# Patient Record
Sex: FEMALE | Race: WHITE | NOT HISPANIC OR LATINO | Employment: FULL TIME
[De-identification: names, ages, dates, MRNs, and addresses within clinical notes are randomized per-mention and may not be internally consistent; named-entity substitution may affect disease eponyms.]

---

## 2017-01-31 ENCOUNTER — HOSPITAL (OUTPATIENT)
Dept: OTHER | Age: 57
End: 2017-01-31

## 2017-11-01 ENCOUNTER — HOSPITAL (OUTPATIENT)
Dept: OTHER | Age: 57
End: 2017-11-01

## 2020-06-29 ENCOUNTER — HOSPITAL (OUTPATIENT)
Dept: OTHER | Age: 60
End: 2020-06-29
Attending: INTERNAL MEDICINE

## 2020-11-19 ENCOUNTER — HOSPITAL ENCOUNTER (OUTPATIENT)
Age: 60
End: 2020-11-19
Attending: PLASTIC SURGERY | Admitting: PLASTIC SURGERY

## 2020-12-04 RX ORDER — LANSOPRAZOLE 30 MG/1
30 CAPSULE, DELAYED RELEASE ORAL DAILY
COMMUNITY

## 2020-12-04 RX ORDER — ESTRADIOL 1 MG/1
1 TABLET ORAL DAILY
COMMUNITY

## 2020-12-04 RX ORDER — DESLORATADINE 5 MG/1
5 TABLET ORAL DAILY
COMMUNITY

## 2020-12-04 RX ORDER — FLUTICASONE PROPIONATE AND SALMETEROL 250; 50 UG/1; UG/1
1 POWDER RESPIRATORY (INHALATION)
COMMUNITY

## 2020-12-04 RX ORDER — LEVOTHYROXINE SODIUM 0.05 MG/1
50 TABLET ORAL DAILY
COMMUNITY

## 2020-12-04 RX ORDER — LISINOPRIL 20 MG/1
20 TABLET ORAL DAILY
COMMUNITY

## 2020-12-05 ENCOUNTER — LAB SERVICES (OUTPATIENT)
Dept: LAB | Age: 60
End: 2020-12-05

## 2020-12-05 DIAGNOSIS — Z01.812 PRE-PROCEDURAL LABORATORY EXAMINATION: ICD-10-CM

## 2020-12-05 DIAGNOSIS — Z01.812 PRE-PROCEDURE LAB EXAM: Primary | ICD-10-CM

## 2020-12-05 LAB
SARS-COV-2 RNA RESP QL NAA+PROBE: NOT DETECTED
SERVICE CMNT-IMP: NORMAL
SERVICE CMNT-IMP: NORMAL

## 2020-12-05 PROCEDURE — U0003 INFECTIOUS AGENT DETECTION BY NUCLEIC ACID (DNA OR RNA); SEVERE ACUTE RESPIRATORY SYNDROME CORONAVIRUS 2 (SARS-COV-2) (CORONAVIRUS DISEASE [COVID-19]), AMPLIFIED PROBE TECHNIQUE, MAKING USE OF HIGH THROUGHPUT TECHNOLOGIES AS DESCRIBED BY CMS-2020-01-R: HCPCS | Performed by: PLASTIC SURGERY

## 2020-12-07 ENCOUNTER — HOSPITAL ENCOUNTER (OUTPATIENT)
Age: 60
Discharge: HOME | End: 2020-12-07
Attending: PLASTIC SURGERY | Admitting: PLASTIC SURGERY

## 2020-12-07 VITALS
TEMPERATURE: 97.2 F | BODY MASS INDEX: 34.15 KG/M2 | WEIGHT: 200 LBS | DIASTOLIC BLOOD PRESSURE: 86 MMHG | RESPIRATION RATE: 15 BRPM | SYSTOLIC BLOOD PRESSURE: 129 MMHG | HEART RATE: 74 BPM | OXYGEN SATURATION: 98 % | HEIGHT: 64 IN

## 2020-12-07 PROCEDURE — 10002801 HB RX 250 W/O HCPCS: Performed by: PLASTIC SURGERY

## 2020-12-07 PROCEDURE — 13000115 HB ORTHO BASIC CASE EA ADD MINUTE: Performed by: PLASTIC SURGERY

## 2020-12-07 PROCEDURE — 10002803 HB RX 637: Performed by: PLASTIC SURGERY

## 2020-12-07 PROCEDURE — 13000114 HB ORTHO BASIC CASE S/U + 1ST 15 MIN: Performed by: PLASTIC SURGERY

## 2020-12-07 PROCEDURE — 13000001 HB PHASE II RECOVERY EA 30 MINUTES: Performed by: PLASTIC SURGERY

## 2020-12-07 PROCEDURE — 10006023 HB SUPPLY 272: Performed by: PLASTIC SURGERY

## 2020-12-07 PROCEDURE — 88342 IMHCHEM/IMCYTCHM 1ST ANTB: CPT | Performed by: PLASTIC SURGERY

## 2020-12-07 RX ORDER — BACITRACIN ZINC 500 [USP'U]/G
OINTMENT TOPICAL PRN
Status: DISCONTINUED | OUTPATIENT
Start: 2020-12-07 | End: 2020-12-07 | Stop reason: HOSPADM

## 2020-12-07 ASSESSMENT — PAIN SCALES - GENERAL
PAINLEVEL_OUTOF10: 0
PAINLEVEL_OUTOF10: 0

## 2020-12-16 LAB
ASR DISCLAIMER: NORMAL
CASE RPRT: NORMAL
CLINICAL INFO: NORMAL
PATH REPORT.FINAL DX SPEC: NORMAL
PATH REPORT.GROSS SPEC: NORMAL

## 2021-12-08 ENCOUNTER — IMAGING SERVICES (OUTPATIENT)
Dept: MAMMOGRAPHY | Age: 61
End: 2021-12-08
Attending: FAMILY MEDICINE

## 2021-12-08 DIAGNOSIS — Z12.31 ENCOUNTER FOR SCREENING MAMMOGRAM FOR MALIGNANT NEOPLASM OF BREAST: ICD-10-CM

## 2021-12-08 PROCEDURE — 77067 SCR MAMMO BI INCL CAD: CPT | Performed by: RADIOLOGY

## 2021-12-08 PROCEDURE — 77063 BREAST TOMOSYNTHESIS BI: CPT | Performed by: RADIOLOGY

## 2021-12-26 ENCOUNTER — WALK IN (OUTPATIENT)
Dept: URGENT CARE | Age: 61
End: 2021-12-26
Attending: EMERGENCY MEDICINE

## 2021-12-26 VITALS
BODY MASS INDEX: 39.48 KG/M2 | SYSTOLIC BLOOD PRESSURE: 118 MMHG | OXYGEN SATURATION: 98 % | WEIGHT: 230 LBS | RESPIRATION RATE: 16 BRPM | TEMPERATURE: 98.4 F | DIASTOLIC BLOOD PRESSURE: 70 MMHG | HEART RATE: 80 BPM

## 2021-12-26 DIAGNOSIS — H66.001 NON-RECURRENT ACUTE SUPPURATIVE OTITIS MEDIA OF RIGHT EAR WITHOUT SPONTANEOUS RUPTURE OF TYMPANIC MEMBRANE: ICD-10-CM

## 2021-12-26 DIAGNOSIS — Z20.822 SUSPECTED COVID-19 VIRUS INFECTION: Primary | ICD-10-CM

## 2021-12-26 DIAGNOSIS — J11.1 INFLUENZA-LIKE ILLNESS: ICD-10-CM

## 2021-12-26 LAB
FLUAV RNA NPH QL NAA+PROBE: NOT DETECTED
FLUBV RNA NPH QL NAA+PROBE: NOT DETECTED
RSV AG NPH QL IA.RAPID: NOT DETECTED
SARS-COV-2 N GENE CT SPEC QN NAA N2: 17.2
SARS-COV-2 RNA RESP QL NAA+PROBE: DETECTED
SERVICE CMNT-IMP: ABNORMAL

## 2021-12-26 PROCEDURE — 0241U COVID/FLU/RSV PANEL: CPT | Performed by: EMERGENCY MEDICINE

## 2021-12-26 PROCEDURE — C9803 HOPD COVID-19 SPEC COLLECT: HCPCS

## 2021-12-26 PROCEDURE — 99202 OFFICE O/P NEW SF 15 MIN: CPT

## 2021-12-26 RX ORDER — AMOXICILLIN 875 MG/1
875 TABLET, COATED ORAL 2 TIMES DAILY
Qty: 20 TABLET | Refills: 0 | Status: SHIPPED | OUTPATIENT
Start: 2021-12-26 | End: 2022-01-05

## 2021-12-26 ASSESSMENT — PAIN SCALES - GENERAL
PAINLEVEL: 6
PAINLEVEL_OUTOF10: 6

## 2022-01-03 ENCOUNTER — TELEPHONE (OUTPATIENT)
Dept: SCHEDULING | Age: 62
End: 2022-01-03

## 2022-01-05 ENCOUNTER — OFFICE VISIT (OUTPATIENT)
Dept: URGENT CARE | Age: 62
End: 2022-01-05

## 2022-01-05 VITALS — SYSTOLIC BLOOD PRESSURE: 135 MMHG | HEART RATE: 92 BPM | DIASTOLIC BLOOD PRESSURE: 79 MMHG | OXYGEN SATURATION: 97 %

## 2022-01-05 DIAGNOSIS — R06.02 SOB (SHORTNESS OF BREATH): Primary | ICD-10-CM

## 2022-01-05 DIAGNOSIS — R05.9 COUGHING: ICD-10-CM

## 2022-01-05 DIAGNOSIS — J01.10 ACUTE FRONTAL SINUSITIS, RECURRENCE NOT SPECIFIED: ICD-10-CM

## 2022-01-05 PROCEDURE — 99213 OFFICE O/P EST LOW 20 MIN: CPT | Performed by: FAMILY MEDICINE

## 2022-01-05 PROCEDURE — U0005 INFEC AGEN DETEC AMPLI PROBE: HCPCS | Performed by: PSYCHIATRY & NEUROLOGY

## 2022-01-05 PROCEDURE — U0003 INFECTIOUS AGENT DETECTION BY NUCLEIC ACID (DNA OR RNA); SEVERE ACUTE RESPIRATORY SYNDROME CORONAVIRUS 2 (SARS-COV-2) (CORONAVIRUS DISEASE [COVID-19]), AMPLIFIED PROBE TECHNIQUE, MAKING USE OF HIGH THROUGHPUT TECHNOLOGIES AS DESCRIBED BY CMS-2020-01-R: HCPCS | Performed by: PSYCHIATRY & NEUROLOGY

## 2022-01-05 RX ORDER — CEFUROXIME AXETIL 250 MG/1
250 TABLET ORAL 2 TIMES DAILY
Qty: 20 TABLET | Refills: 0 | Status: SHIPPED | OUTPATIENT
Start: 2022-01-05

## 2022-01-05 RX ORDER — IPRATROPIUM BROMIDE 42 UG/1
2 SPRAY, METERED NASAL 4 TIMES DAILY
Qty: 15 ML | Refills: 12 | Status: SHIPPED | OUTPATIENT
Start: 2022-01-05

## 2022-01-05 ASSESSMENT — ENCOUNTER SYMPTOMS
RHINORRHEA: 1
SINUS PAIN: 1
COUGH: 1
SINUS PRESSURE: 1

## 2022-01-06 LAB
SARS-COV-2 RNA RESP QL NAA+PROBE: DETECTED
SERVICE CMNT-IMP: ABNORMAL

## 2022-01-07 ENCOUNTER — TELEPHONE (OUTPATIENT)
Dept: URGENT CARE | Age: 62
End: 2022-01-07

## 2023-02-16 ENCOUNTER — OFFICE VISIT (OUTPATIENT)
Dept: INTERNAL MEDICINE CLINIC | Facility: CLINIC | Age: 63
End: 2023-02-16
Payer: COMMERCIAL

## 2023-02-16 VITALS
WEIGHT: 229 LBS | SYSTOLIC BLOOD PRESSURE: 122 MMHG | RESPIRATION RATE: 16 BRPM | BODY MASS INDEX: 40.07 KG/M2 | HEIGHT: 63.5 IN | HEART RATE: 78 BPM | DIASTOLIC BLOOD PRESSURE: 78 MMHG

## 2023-02-16 DIAGNOSIS — R73.03 PREDIABETES: ICD-10-CM

## 2023-02-16 DIAGNOSIS — I10 HYPERTENSION, UNSPECIFIED TYPE: ICD-10-CM

## 2023-02-16 DIAGNOSIS — Z51.81 THERAPEUTIC DRUG MONITORING: Primary | ICD-10-CM

## 2023-02-16 DIAGNOSIS — F43.9 STRESS: ICD-10-CM

## 2023-02-16 DIAGNOSIS — E66.9 OBESITY (BMI 30-39.9): ICD-10-CM

## 2023-02-16 PROCEDURE — 3074F SYST BP LT 130 MM HG: CPT | Performed by: NURSE PRACTITIONER

## 2023-02-16 PROCEDURE — 3078F DIAST BP <80 MM HG: CPT | Performed by: NURSE PRACTITIONER

## 2023-02-16 PROCEDURE — 3008F BODY MASS INDEX DOCD: CPT | Performed by: NURSE PRACTITIONER

## 2023-02-16 PROCEDURE — 99204 OFFICE O/P NEW MOD 45 MIN: CPT | Performed by: NURSE PRACTITIONER

## 2023-02-16 RX ORDER — ASCORBIC ACID 100 MG
1 TABLET,CHEWABLE ORAL DAILY
COMMUNITY

## 2023-02-16 RX ORDER — ESTRADIOL 2 MG/1
TABLET ORAL DAILY
COMMUNITY

## 2023-02-16 RX ORDER — ALBUTEROL SULFATE 90 UG/1
2 AEROSOL, METERED RESPIRATORY (INHALATION) EVERY 6 HOURS PRN
COMMUNITY
Start: 2022-10-17

## 2023-02-16 RX ORDER — FLUTICASONE PROPIONATE AND SALMETEROL 250; 50 UG/1; UG/1
POWDER RESPIRATORY (INHALATION)
COMMUNITY
Start: 2022-07-06

## 2023-02-16 RX ORDER — DESLORATADINE 5 MG/1
TABLET ORAL
COMMUNITY
Start: 2022-07-06

## 2023-02-16 RX ORDER — PANTOPRAZOLE SODIUM 40 MG/1
TABLET, DELAYED RELEASE ORAL
COMMUNITY
Start: 2022-07-06

## 2023-02-16 RX ORDER — LISINOPRIL 20 MG/1
TABLET ORAL
COMMUNITY
Start: 2022-07-06

## 2023-02-16 NOTE — PROGRESS NOTES
Patient teaching on [facility]. performed. Patient demonstrated back, all questions were answered and patient verbalized understanding.  ABEBA

## 2023-02-16 NOTE — PATIENT INSTRUCTIONS
Welcome to the [facility] Program!!  Thank you for placing your trust in our health care team, I look forward to working with you along this journey to better health! Next steps:     1.  Schedule a personal nutrition consultation with one of our registered dieticians. Bring along your food journal (3 days minimum). See journal options below. 2.  Fill your prescribed medication and take as discussed and prescribed:   Will trial wegovy 0.25mg weekly X 4 weeks (sample, demo given and 0.5mg sent to pharm)      Please try to work on the following dietary changes this first month:  Daily protein recommendation to start:  grams  Daily carbohydrate: <125g   Daily calories: 1,500  1. Drink water with meals and throughout the day, cut down on soda and/or juice if consumed. Consider flavored water options like Bubbly, Spindrift, Hint and Gemma. 2.  Eat breakfast daily and focus on having protein with each meal, examples include: greek yogurt, cottage cheese, hard boiled egg, whole grain toast with peanut butter. 3.  Reduce refined carbohydrates and sugars which includes items such as sweets, as well as rice, pasta, and bread and make sure to choose whole grain options when having them with just 1 serving per meal about the size of your inner palm. 4.  Consume non starchy veggies daily working towards making them a good 50% of your daily food intake. Add them to lunch and dinner consistently. 5.  Start a daily probiotic: VSL#3 is recommended, (order on line at www.vsl3. com). Take 1 capsule daily with water for 30 days, then reduce to 1 every other day (this will reduce the cost). Capsules can be left out for 2 weeks, but then must be refrigerated. Please download delio Decisive BI Dede Celestin! Or Net Diary to monitor daily dietary intake and you will be able to see if you are eating the right amount of calories or too much or too little which would hinder weight loss.  Additionally this will help to see your daily carbohydrate and protein intake. When you set the zelda up choose 1-2 lbs/week as a goal.  Keeping a paper food journal is an option as well to remain accountable for your choices- this is the start to mindful eating! A low calorie diet has been consistently shown to support weight loss. Continue or start exercising to help establish a routine. If not already exercising begin with 1 day and progress as able with long-term goal of 30 minutes 5 days a week at a minimum. Meditation daily can help manage and control stress. Chronic stress can make weight loss difficult. Exercising is one way to help with stress, but meditation using the CALM Zelda or another comparable alternative can be done in your home or place of work with little time commitment. This Zelda can also help work on behavior change and improve sleep. Check out the segment under Calm Masterclass and listen to The 4 Pillars of Health. A great way to begin learning about the foundation of lifestyle with practical tips to use in your every day. Check out www.yourweightmatters. org blog for continued daily support and education along this weight loss journey! Patient Resources:     Personal Training/Fitness Classes/Health Coaching     Vic Owens and Menjivar Sophiaside @ [email or web address]/ Full fitness center with group fitness and personal training. [facility]. Health Coaching and Personal Training with [facility] at our [facility]- individual weekly coaching with option to add personal training and small group fitness classes targeted at weight loss- [phone] and/or email @ [email or web address]  360FIT [place] [email or web address] Group Fitness [phone] and/or email Naomi Arias at [email or web address]  [address] with multiple locations: Donna (www.Sugar Free Media), Eat The Frog Fitness (www.eatEnteroMedics. Fastr), Fit Body Bootcamp (www.fitbodybootcamp.Fastr), Instacoach Fitness (www.via680), The Exercise  (www.exercisecoach.Fastr)     Online Fitness  Fitness  on Whole Foods in 10 DVD series- www. sqjkz33KGX. Fastr  Sit and Be Fit - Chair exercise series Www.sitandbefit. org  Hip Hop Fit with Thaddeus Jensen at www.hiphopfit. net     Apps for on the [place]ock 7 Minute Workout (orange box with white 7) - free on the go HIIT training zelda  Peloton Zelda @ wwwIIX Inc.     Nutrition Trackers and Tools  LoseIT! And My Fitness Pal apps and on line for tracking nutrition  NOOM - virtual health coaching  FitFoundation (healthy meals on the go) in [facility]-SCI @ wwwSyringeTech eabdtcefikoej1p. Janet Boucher MD @ LightSail Energy and Heath Flores (keto and low carb plans recommended) @ www. LUMLSX34.OAM, Metabolic Meals @ www. MyMetabolicMeals. com - individual prepared meals to go  BoxFox, Web Performance, International Business Machines, Every Plate, scroll kit- on line meal delivery programs for preparation at home  [place] adsquare in [place] for homemade meals to go @ wwwSyringeTechmealSecureNet. Fastr  Diet Doctor @ www. dietdoctor. com - low carb swaps  Everpay - meal prep and planning zelda (www.yummly. com)     Stress Management/Behavior/Mindful Eating  CALM meditation zelda (www.calm. Fastr)  Headspace  Am I Hungry? Mindful eating virtual  zelda  Www.yourweightmatters. org - Obesity Action Coalition sponsored Blog posts daily  Motivation zelda (black box with white \")- daily supportive messages sent to your phone     Books/Video Education/Podcasts  Mindless Eating by Reji Noe  Why We Get Sick by Santi Woods (a book about insulin resistance)  Atomic Habits by Faucett Blinkit (a book about taking small steps to promote greater behavior change)   Can't Hurt Me by Bronson Fulton (a book exploring the power of discipline in achieving your goals)  The End of Dieting:  How to Live for Life by Dr. Luis Coburn M.D. or listen to The [address] [facility] Episode 63: Understanding \"Nutritarian\" Eating w/Dr. Lulú Santana  Your Body in Balance: The Nexus EnergyHomes of Food, Hormones, and Health by Dr. Sabas Neely  The Menopause Diet Plan by Onofre Harris and [facility]  The Complete Guide to fasting by Dr. Aide Hill, [address] [facility] by Inge Feliciano, Ph.D, R.D. Weight Loss Surgery Will Not Treat Food Addiction by Irma Meraz Ph.D  The [address] [place] [address] on plant based nutrition  Fed Up - documentary about obesity (Free on New Neater Pet Brandsn)  The Truth About Sugar - documentary on sugar (Free on Network Game Interaction, https://youtu. be/9H4uhjjTT4t)  The Dr. Luz Maria Real by Dr. Mary Marshall MD  Fitlosophy Fitspiration - journal to better health (found at [employer] in fitness aisle)  What Happened to You?- a look at the impact trauma has on behavior written by Fatemeh Warren and Dr. Vania Martinez Again by Sher Bar - discovering your true self after trauma  Heidy Cedeño talk on Rockabox, The Call to Courage  Podcasts: The Exam Room by the Physician's Committee, Nutrition Facts by Dr. Hernandez Ann    We are here to support you with weight loss, but please remember that you still need your primary care provider for your routine health maintenance.

## 2023-03-01 ENCOUNTER — PATIENT MESSAGE (OUTPATIENT)
Dept: INTERNAL MEDICINE CLINIC | Facility: CLINIC | Age: 63
End: 2023-03-01

## 2023-03-01 DIAGNOSIS — Z51.81 THERAPEUTIC DRUG MONITORING: ICD-10-CM

## 2023-03-01 DIAGNOSIS — I10 HYPERTENSION, UNSPECIFIED TYPE: ICD-10-CM

## 2023-03-01 DIAGNOSIS — R73.03 PREDIABETES: ICD-10-CM

## 2023-03-01 DIAGNOSIS — E66.9 OBESITY (BMI 30-39.9): Primary | ICD-10-CM

## 2023-03-09 NOTE — TELEPHONE ENCOUNTER
Requesting wegovy  LOV: 2/16/23  RTC: 6 weeks  Last Relevant Labs: na  Filled: 2/16/23 #2ml with 0 refills WEgovy 0.25 mg    No future appointments.   Reminded to schedule  Pended 0.5 mg

## 2023-03-14 ENCOUNTER — PATIENT MESSAGE (OUTPATIENT)
Dept: INTERNAL MEDICINE CLINIC | Facility: CLINIC | Age: 63
End: 2023-03-14

## 2023-03-15 ENCOUNTER — TELEPHONE (OUTPATIENT)
Dept: INTERNAL MEDICINE CLINIC | Facility: CLINIC | Age: 63
End: 2023-03-15

## 2023-03-15 NOTE — TELEPHONE ENCOUNTER
CIgna form filled out and faxed it with the recent chart notes attached   Fax # [phone]  Phone [phone]

## 2023-03-16 ENCOUNTER — OFFICE VISIT (OUTPATIENT)
Dept: INTERNAL MEDICINE CLINIC | Facility: CLINIC | Age: 63
End: 2023-03-16
Payer: COMMERCIAL

## 2023-03-16 DIAGNOSIS — E66.9 OBESITY (BMI 30-39.9): ICD-10-CM

## 2023-03-16 DIAGNOSIS — Z51.81 THERAPEUTIC DRUG MONITORING: ICD-10-CM

## 2023-03-16 PROCEDURE — 97802 MEDICAL NUTRITION INDIV IN: CPT

## 2023-04-15 ENCOUNTER — PATIENT MESSAGE (OUTPATIENT)
Dept: INTERNAL MEDICINE CLINIC | Facility: CLINIC | Age: 63
End: 2023-04-15

## 2023-04-17 NOTE — TELEPHONE ENCOUNTER
Requesting wegovy increase  LOV: 2/16/23  RTC: 6 weeks  Last Relevant Labs: na  Filled: 3/9/23 #2ml with 0 refills wegovy 0.5 mg    Future Appointments   Date Time Provider Dillon Wolff   4/20/2023  8:00 AM Regine Rogers RD [facility]   5/3/2023 10:00 AM Ramiro Mcfarland APRN [facility]

## 2023-04-20 ENCOUNTER — OFFICE VISIT (OUTPATIENT)
Dept: INTERNAL MEDICINE CLINIC | Facility: CLINIC | Age: 63
End: 2023-04-20
Payer: COMMERCIAL

## 2023-04-20 DIAGNOSIS — Z12.39 BREAST SCREENING: Primary | ICD-10-CM

## 2023-04-20 DIAGNOSIS — Z51.81 THERAPEUTIC DRUG MONITORING: ICD-10-CM

## 2023-04-20 DIAGNOSIS — I10 HYPERTENSION, UNSPECIFIED TYPE: ICD-10-CM

## 2023-04-20 DIAGNOSIS — E66.9 OBESITY (BMI 30-39.9): ICD-10-CM

## 2023-04-20 PROCEDURE — 97803 MED NUTRITION INDIV SUBSEQ: CPT

## 2023-04-26 ENCOUNTER — HOSPITAL ENCOUNTER (OUTPATIENT)
Dept: MAMMOGRAPHY | Age: 63
Discharge: HOME | End: 2023-04-26
Attending: FAMILY MEDICINE

## 2023-04-26 DIAGNOSIS — Z12.39 BREAST SCREENING: ICD-10-CM

## 2023-04-26 PROCEDURE — 77063 BREAST TOMOSYNTHESIS BI: CPT

## 2023-05-05 ENCOUNTER — PATIENT MESSAGE (OUTPATIENT)
Dept: INTERNAL MEDICINE CLINIC | Facility: CLINIC | Age: 63
End: 2023-05-05

## 2023-05-05 ENCOUNTER — TELEMEDICINE (OUTPATIENT)
Dept: INTERNAL MEDICINE CLINIC | Facility: CLINIC | Age: 63
End: 2023-05-05
Payer: COMMERCIAL

## 2023-05-05 DIAGNOSIS — R73.03 PREDIABETES: ICD-10-CM

## 2023-05-05 DIAGNOSIS — Z51.81 THERAPEUTIC DRUG MONITORING: Primary | ICD-10-CM

## 2023-05-05 DIAGNOSIS — F43.9 STRESS: ICD-10-CM

## 2023-05-05 DIAGNOSIS — I10 HYPERTENSION, UNSPECIFIED TYPE: ICD-10-CM

## 2023-05-05 DIAGNOSIS — E66.9 OBESITY (BMI 30-39.9): ICD-10-CM

## 2023-05-05 PROCEDURE — 99213 OFFICE O/P EST LOW 20 MIN: CPT | Performed by: NURSE PRACTITIONER

## 2023-05-05 NOTE — PATIENT INSTRUCTIONS
Next steps:  1. Fill your prescribed medication and take as discussed and prescribed: reduce wegovy 0.5mg weekly  2. Schedule a personal nutrition consultation with one of our registered dieticians       1. Drink water with meals and throughout the day, cut down on soda and/or juice if consumed. Consider flavored water options like Bubbly, Spindrift, Hint and Gemma. 2.  Eat breakfast daily and focus on having protein with each meal, examples include: greek yogurt, cottage cheese, hard boiled egg, whole grain toast with peanut butter. 3.  Reduce refined carbohydrates and sugars which includes items such as sweets, as well as rice, pasta, and bread and make sure to choose whole grain options when having them with just 1 serving per meal about the size of your inner palm. 4.  Consume non starchy veggies daily working towards making them a good 50% of your daily food intake. Add them to lunch and dinner consistently. 5.  Start a daily probiotic: VSL#3 is recommended, (order on line at www.vsl3. com). Take 1 capsule daily with water for 30 days, then reduce to 1 every other day (this will reduce the cost). Capsules can be left out for 2 weeks, but then must be refrigerated. Please download delio My Fitness River Rodriguez! Or Net Diary to monitor daily dietary intake and you will be able to see if you are eating the right amount of calories or too much or too little which would hinder weight loss. Additionally this will help to see your daily carbohydrate and protein intake. When you set the delio up choose 1-2 lbs/week as a goal.  Keeping a paper food journal is an option as well to remain accountable for your choices- this is the start to mindful eating! A low calorie diet has been consistently shown to support weight loss. Continue or start exercising to help establish a routine. If not already exercising begin with 1 day and progress as able with long-term goal of 30 minutes 5 days a week at a minimum. Meditation daily can help manage and control stress. Chronic stress can make weight loss difficult. Exercising is one way to help with stress, but meditation using the CALM Zelda or another comparable alternative can be done in your home or place of work with little time commitment. This Zelda can also help work on behavior change and improve sleep. Check out the segment under Calm Masterclass and listen to The 4 Pillars of Health. A great way to begin learning about the foundation of lifestyle with practical tips to use in your every day. Check out www.yourweightmatters. org blog for continued daily support and education along this weight loss journey! Patient Resources:     Personal Training/Fitness Classes/Health Coaching     Vic 112 and Menjivar Sophiaside @ [email or web address]/ Full fitness center with group fitness and personal training. Discount available as client of [facility]. Health Coaching and Personal Training with Cherelle Valladares at our [facility]- individual weekly coaching with option to add personal training and small group fitness classes targeted at weight loss- [phone] and/or email @ [email or web address] [email or web address]. [email or web address]  360FIT [place] [email or web address] Group Fitness [phone] and/or email Telly Harrell at [email or web address]. [email or web address] Systems  [address] with multiple locations: Aetna (www.Neos Corporation), Eat The NanoNord Fitness (www.SimplyInsured. Butter Systems), Fit Body Bootcamp (www.CometabodybootAccuris Networksp.Butter Systems), Best Doctors (www.China Everbright International), The Exercise  (www.exercisecoach.Butter Systems)     Online Fitness  Fitness  on Whole Foods in 10 DVD series- www. btjrv37JBM. Butter Systems  Sit and Be Fit - Chair exercise series Www.sitandbefit. org  Hip Hop Fit with Thaddeus Jensen at www.hiphopfit. net     Apps for on the Myla 7 Minute Workout (orange box with white 7) - free on the go HIIT training zelda  Peloton Zelda @ www. Shopzilla     Nutrition Trackers and Tools  LoseIT! And My Fitness Pal apps and on line for tracking nutrition  NOOM - virtual health coaching  FitFoundation (healthy meals on the go) in [facility] @ www. tcdmftzeglxia6k. Nelly Cooks MD @ www.bistromd.com and Nehemias Ritter (keto and low carb plans recommended) @ www. BXFLSB99.KBV, Metabolic Meals @ www. eZonoMetabolicMeals. com - individual prepared meals to go  Invodo, ConsumerBell, International Business Machines, Every Plate, Sunlight Photonics- on line meal delivery programs for preparation at home  [place] [employer] Project in [place] for homemade meals to go @ wwwHighmark Health. [place]  Diet Doctor @ www. dietdoctor. [place] - low carb swaps  YummLISNR - meal prep and planning zelda (www.yummly. com)     Stress Management/Behavior/Mindful Eating  CALM meditation zelda (wwwVideo Blocks)  Headspace  Am I Hungry? Mindful eating virtual  zelda  Www.yourweightmatters. org - Obesity Action Coalition sponsored Blog posts daily  Motivation zelda (black box with white \")- daily supportive messages sent to your phone     Books/Video Education/Podcasts  Mindless Eating by Galo Martinez  Why We Get Sick by José Yoder (a book about insulin resistance)  Atomic Habits by Derrek Bird (a book about taking small steps to promote greater behavior change)   Can't Hurt Me by Saroj Bernabe (a book exploring the power of discipline in achieving your goals)  The End of Dieting: How to Live for Life by Dr. Lulú Santana M.D. or listen to The [address] [facility] Episode 61: Understanding \"Nutritarian\" Eating w/Dr. Lulú Santana  Your Body in Balance: The World Fuel Services Corporation of Food, Hormones, and Health by Dr. Sabas Neely  The Menopause Diet Plan by Onofre Harris and [facility] AT [facility]  The Complete Guide to fasting by Dr. Aide Hlil, [address] [facility] by Inge Feliciano, Ph.D, R.D.   Weight Loss Surgery Will Not Treat Food Addiction by Irma Meraz Ph.D  The Game Changers- Honestly.com Documentary on plant based nutrition  Fed Up - documentary about obesity (Free on Utube)  The Truth About Sugar - documentary on sugar (Free on Utube, https://youtu. be/4P3qsptQB8b)  The Dr. Arlo Dakin by Dr. Luisa Wright MD  Fitlosophy Fitspiration - journal to better health (found at [employer] in fitness aisle)  What Happened to You?- a look at the impact trauma has on behavior written by Bipin Lawrence and Dr. Dalia Sung Again by Patricia Moses - discovering your true self after trauma  Zuleyka Branch talk on Pegasus Biologics, The Call to Courage  Podcasts: The Exam Room by the Physician's Committee, Nutrition Facts by Dr. Henrry Beaver    We are here to support you with weight loss, but please remember that you still need your primary care provider for your routine health maintenance.

## 2023-05-08 NOTE — TELEPHONE ENCOUNTER
From: Elissa Duke  To: ISAAC Bartlett  Sent: 5/5/2023 1:28 AM CDT  Subject: Labs     Here are my labs that I drawn with my PCP. Had them with me for my appt Wednesday (wrong office). EKG to follow in next message.

## 2023-05-22 ENCOUNTER — HOSPITAL ENCOUNTER (OUTPATIENT)
Dept: ULTRASOUND IMAGING | Age: 63
Discharge: HOME | End: 2023-05-22
Attending: FAMILY MEDICINE

## 2023-05-22 ENCOUNTER — HOSPITAL ENCOUNTER (OUTPATIENT)
Dept: MAMMOGRAPHY | Age: 63
Discharge: HOME | End: 2023-05-22
Attending: FAMILY MEDICINE

## 2023-05-22 DIAGNOSIS — R92.8 ABNORMAL MAMMOGRAM: ICD-10-CM

## 2023-05-22 PROCEDURE — G0279 TOMOSYNTHESIS, MAMMO: HCPCS

## 2023-05-22 PROCEDURE — 76642 ULTRASOUND BREAST LIMITED: CPT

## 2023-06-05 ENCOUNTER — HOSPITAL ENCOUNTER (OUTPATIENT)
Dept: MAMMOGRAPHY | Age: 63
Discharge: HOME | End: 2023-06-05
Attending: FAMILY MEDICINE

## 2023-06-05 ENCOUNTER — HOSPITAL ENCOUNTER (OUTPATIENT)
Dept: ULTRASOUND IMAGING | Age: 63
Discharge: HOME | End: 2023-06-05
Attending: FAMILY MEDICINE

## 2023-06-05 DIAGNOSIS — R92.8 ABNORMAL MAMMOGRAM: ICD-10-CM

## 2023-06-05 PROCEDURE — 19083 BX BREAST 1ST LESION US IMAG: CPT

## 2023-06-05 PROCEDURE — 10006023 HB SUPPLY 272

## 2023-06-05 PROCEDURE — 77065 DX MAMMO INCL CAD UNI: CPT

## 2023-06-05 PROCEDURE — 88305 TISSUE EXAM BY PATHOLOGIST: CPT

## 2023-06-05 PROCEDURE — A4648 IMPLANTABLE TISSUE MARKER: HCPCS

## 2023-06-05 PROCEDURE — 10006027 HB SUPPLY 278

## 2023-06-05 PROCEDURE — 10002801 HB RX 250 W/O HCPCS: Performed by: RADIOLOGY

## 2023-06-05 RX ORDER — LIDOCAINE HYDROCHLORIDE AND EPINEPHRINE 10; 10 MG/ML; UG/ML
10 INJECTION, SOLUTION INFILTRATION; PERINEURAL ONCE
Status: COMPLETED | OUTPATIENT
Start: 2023-06-05 | End: 2023-06-05

## 2023-06-05 RX ORDER — LIDOCAINE HYDROCHLORIDE 10 MG/ML
1 INJECTION, SOLUTION INFILTRATION; PERINEURAL ONCE
Status: COMPLETED | OUTPATIENT
Start: 2023-06-05 | End: 2023-06-05

## 2023-06-05 RX ADMIN — LIDOCAINE HYDROCHLORIDE,EPINEPHRINE BITARTRATE 10 ML: 10; .01 INJECTION, SOLUTION INFILTRATION; PERINEURAL at 13:45

## 2023-06-05 RX ADMIN — LIDOCAINE HYDROCHLORIDE 10 MG: 10 INJECTION, SOLUTION INFILTRATION; PERINEURAL at 13:45

## 2023-06-08 ENCOUNTER — TELEPHONE (OUTPATIENT)
Dept: ONCOLOGY | Age: 63
End: 2023-06-08

## 2023-06-09 ENCOUNTER — PATIENT MESSAGE (OUTPATIENT)
Dept: INTERNAL MEDICINE CLINIC | Facility: CLINIC | Age: 63
End: 2023-06-09

## 2023-06-11 RX ORDER — SEMAGLUTIDE 1.7 MG/.75ML
1.7 INJECTION, SOLUTION SUBCUTANEOUS WEEKLY
Qty: 3 ML | Refills: 0 | Status: SHIPPED | OUTPATIENT
Start: 2023-06-11 | End: 2023-07-03

## 2023-06-22 ENCOUNTER — OFFICE VISIT (OUTPATIENT)
Dept: INTERNAL MEDICINE CLINIC | Facility: CLINIC | Age: 63
End: 2023-06-22
Payer: COMMERCIAL

## 2023-06-22 VITALS
RESPIRATION RATE: 18 BRPM | WEIGHT: 203 LBS | HEART RATE: 83 BPM | HEIGHT: 63.5 IN | SYSTOLIC BLOOD PRESSURE: 106 MMHG | OXYGEN SATURATION: 99 % | BODY MASS INDEX: 35.52 KG/M2 | DIASTOLIC BLOOD PRESSURE: 60 MMHG

## 2023-06-22 DIAGNOSIS — E66.9 OBESITY (BMI 30-39.9): ICD-10-CM

## 2023-06-22 DIAGNOSIS — R73.03 PREDIABETES: ICD-10-CM

## 2023-06-22 DIAGNOSIS — Z51.81 THERAPEUTIC DRUG MONITORING: Primary | ICD-10-CM

## 2023-06-22 DIAGNOSIS — F43.9 STRESS: ICD-10-CM

## 2023-06-22 DIAGNOSIS — I10 HYPERTENSION, UNSPECIFIED TYPE: ICD-10-CM

## 2023-06-22 PROCEDURE — 99214 OFFICE O/P EST MOD 30 MIN: CPT | Performed by: NURSE PRACTITIONER

## 2023-06-22 PROCEDURE — 3078F DIAST BP <80 MM HG: CPT | Performed by: NURSE PRACTITIONER

## 2023-06-22 PROCEDURE — 3008F BODY MASS INDEX DOCD: CPT | Performed by: NURSE PRACTITIONER

## 2023-06-22 PROCEDURE — 3074F SYST BP LT 130 MM HG: CPT | Performed by: NURSE PRACTITIONER

## 2023-06-22 RX ORDER — BUTYROSPERMUM PARKII(SHEA BUTTER), SIMMONDSIA CHINENSIS (JOJOBA) SEED OIL, ALOE BARBADENSIS LEAF EXTRACT .01; 1; 3.5 G/100G; G/100G; G/100G
LIQUID TOPICAL
COMMUNITY
Start: 2020-01-01

## 2023-06-22 RX ORDER — LISINOPRIL 10 MG/1
TABLET ORAL
COMMUNITY
Start: 2023-03-02

## 2023-06-22 RX ORDER — ALPRAZOLAM 0.5 MG/1
TABLET ORAL
COMMUNITY
Start: 2023-03-03

## 2023-06-22 RX ORDER — BIOTIN 10000 MCG
CAPSULE ORAL
COMMUNITY
Start: 2020-01-01

## 2023-06-22 RX ORDER — ESTRADIOL 1 MG/1
TABLET ORAL
COMMUNITY
Start: 2023-06-11

## 2023-06-22 RX ORDER — SEMAGLUTIDE 1.7 MG/.75ML
1.7 INJECTION, SOLUTION SUBCUTANEOUS WEEKLY
Qty: 3 ML | Refills: 0 | Status: SHIPPED | OUTPATIENT
Start: 2023-06-28 | End: 2023-07-20

## 2023-06-22 NOTE — PATIENT INSTRUCTIONS
Next steps:  1. Fill your prescribed medication and take as discussed and prescribed: wegovy 1mg weekly x 2 weeks and then will increase to 1.7mg weekly   2. Schedule a personal nutrition consultation with one of our registered dieticians       1. Drink water with meals and throughout the day, cut down on soda and/or juice if consumed. Consider flavored water options like Bubbly, Spindrift, Hint and Gemma. 2.  Eat breakfast daily and focus on having protein with each meal, examples include: greek yogurt, cottage cheese, hard boiled egg, whole grain toast with peanut butter. 3.  Reduce refined carbohydrates and sugars which includes items such as sweets, as well as rice, pasta, and bread and make sure to choose whole grain options when having them with just 1 serving per meal about the size of your inner palm. 4.  Consume non starchy veggies daily working towards making them a good 50% of your daily food intake. Add them to lunch and dinner consistently. 5.  Start a daily probiotic: VSL#3 is recommended, (order on line at www.vsl3. com). Take 1 capsule daily with water for 30 days, then reduce to 1 every other day (this will reduce the cost). Capsules can be left out for 2 weeks, but then must be refrigerated. Please download delio My Fitness Christ Kayser! Or Net Diary to monitor daily dietary intake and you will be able to see if you are eating the right amount of calories or too much or too little which would hinder weight loss. Additionally this will help to see your daily carbohydrate and protein intake. When you set the delio up choose 1-2 lbs/week as a goal.  Keeping a paper food journal is an option as well to remain accountable for your choices- this is the start to mindful eating! A low calorie diet has been consistently shown to support weight loss. Continue or start exercising to help establish a routine.  If not already exercising begin with 1 day and progress as able with long-term goal of 30 minutes 5 days a week at a minimum. Meditation daily can help manage and control stress. Chronic stress can make weight loss difficult. Exercising is one way to help with stress, but meditation using the CALM Zelda or another comparable alternative can be done in your home or place of work with little time commitment. This Zelda can also help work on behavior change and improve sleep. Check out the segment under Calm Masterclass and listen to The 4 Pillars of Health. A great way to begin learning about the foundation of lifestyle with practical tips to use in your every day. Check out www.yourweightmatters. org blog for continued daily support and education along this weight loss journey! Patient Resources:     Personal Training/Fitness Classes/Health Coaching     Vic Owens and Menjivar Sophiasanisha @ [email or web address]/ Full fitness center with group fitness and personal training. Discount available as client of [facility]. Health Coaching and Personal Training with Eleazar Dougherty at our [facility]- individual weekly coaching with option to add personal training and small group fitness classes targeted at weight loss- [phone] and/or email @ [email or web address]  360FIT [place] [email or web address] Group Fitness [phone] and/or email Avery Croft at [email or web address]. Genetic Technologies inc  [address] [facility] with multiple locations: Aetna (www.JCD), Eat The Immune System Therapeutics Fitness (www.WebStudiyo Productions. Genetic Technologies inc), Fit Body Bootcamp (www.Ideal Mebodybootcamp.Genetic Technologies inc), Project WBS (www.Airstrip Technologies), The Exercise  (www.exercisecoach.Genetic Technologies inc)     Online Fitness  Fitness  on Whole Foods in 10 DVD series- www. euzlx88LSQ. Genetic Technologies inc  Sit and Be Fit - Chair exercise series Www.sitandbefit. org  Hip Hop Fit with Thaddeus Jensen at www.hiphopfit. net     Apps for on the Moberg Research 7 Minute Workout (orange box with white 7) - free on the go HIIT training zelda  Peloton Zelda @ www. bewarket     Nutrition Trackers and Tools  LoseIT! And My Fitness Pal apps and on line for tracking nutrition  NOOM - virtual health coaching  FitFoundation (healthy meals on the go) in [facility] @ www. eqcmjggimhqqp9n. Cony Martinez MD @ www.Bestcake and Leia Johnson (keto and low carb plans recommended) @ www. TKWZRE78.FWK, Metabolic Meals @ www. epacubeMetabolicMeals. com - individual prepared meals to go  RF Controls, Hupu, International Business Machines, Every Plate, Upland Software- on line meal delivery programs for preparation at home  AK Steel Holding Corporation in [facility] Alana Sandoval for homemade meals to go @ wwwSoftGenetics  Diet Doctor @ www. dietdoctor. MOgene - low carb swaps  YuRealTravel - meal prep and planning zelda (www.yummly. com)     Stress Management/Behavior/Mindful Eating  CALM meditation zelda (www.ReVent Medical)  Headspace  Am I Hungry? Mindful eating virtual  zelda  Www.yourweightmatters. org - Obesity Action Coalition sponsored Blog posts daily  Motivation zelda (black box with white \")- daily supportive messages sent to your phone     Books/Video Education/Podcasts  Mindless Eating by Dariusz Vazquez  Why We Get Sick by Belen Rdz (a book about insulin resistance)  Atomic Habits by Chiqui Woodard (a book about taking small steps to promote greater behavior change)   Can't Hurt Me by Chrissy Laws (a book exploring the power of discipline in achieving your goals)  The End of Dieting: How to Live for Life by Dr. Sarah Polk M.D. or listen to The [facility] Episode 61: Understanding \"Nutritarian\" Eating w/Dr. Sarah Polk  Your Body in Balance: The World Fuel Services Corporation of Food, Hormones, and Health by Dr. Renetta Luna  The Menopause Diet Plan by Efrain Albarran and [facility]P AT [facility]  The Complete Guide to fasting by Dr. Mega Santana, [address] [facility] by Pierre Chavez, Ph.D, R.D.   Weight Loss Surgery Will Not Treat Food Addiction by Claudia Geller Ph.D  The [facility] on plant based nutrition  Fed Up - documentary about obesity (Free on Utube)  The Truth About Sugar - documentary on sugar (Free on Utube, https://youtu. be/1F0ptipTA9g)  The Dr. Arlo Dakin by Dr. Luisa Wright MD  Fitlosophy Fitspiration - journal to better health (found at [employer] in fitness aisle)  What Happened to You?- a look at the impact trauma has on behavior written by Bipin Lawrence and Dr. Dalia Sung Again by Patricia Moses - discovering your true self after trauma  Zuleyka Branch talk on Synchronica, The Call to Courage  Podcasts: The Exam Room by the Physician's Committee, Nutrition Facts by Dr. Henrry Beaver    We are here to support you with weight loss, but please remember that you still need your primary care provider for your routine health maintenance.

## 2023-07-06 ENCOUNTER — PATIENT MESSAGE (OUTPATIENT)
Dept: INTERNAL MEDICINE CLINIC | Facility: CLINIC | Age: 63
End: 2023-07-06

## 2023-08-04 ENCOUNTER — PATIENT MESSAGE (OUTPATIENT)
Dept: INTERNAL MEDICINE CLINIC | Facility: CLINIC | Age: 63
End: 2023-08-04

## 2023-08-16 ENCOUNTER — OFFICE VISIT (OUTPATIENT)
Dept: INTERNAL MEDICINE CLINIC | Facility: CLINIC | Age: 63
End: 2023-08-16
Payer: COMMERCIAL

## 2023-08-16 VITALS
BODY MASS INDEX: 33.6 KG/M2 | DIASTOLIC BLOOD PRESSURE: 62 MMHG | HEART RATE: 76 BPM | HEIGHT: 63.5 IN | RESPIRATION RATE: 14 BRPM | WEIGHT: 192 LBS | SYSTOLIC BLOOD PRESSURE: 108 MMHG

## 2023-08-16 DIAGNOSIS — Z51.81 THERAPEUTIC DRUG MONITORING: Primary | ICD-10-CM

## 2023-08-16 DIAGNOSIS — I10 HYPERTENSION, UNSPECIFIED TYPE: ICD-10-CM

## 2023-08-16 DIAGNOSIS — R73.03 PREDIABETES: ICD-10-CM

## 2023-08-16 DIAGNOSIS — F43.9 STRESS: ICD-10-CM

## 2023-08-16 DIAGNOSIS — E66.9 OBESITY (BMI 30-39.9): ICD-10-CM

## 2023-08-16 PROCEDURE — 99214 OFFICE O/P EST MOD 30 MIN: CPT | Performed by: NURSE PRACTITIONER

## 2023-08-16 PROCEDURE — 3008F BODY MASS INDEX DOCD: CPT | Performed by: NURSE PRACTITIONER

## 2023-08-16 PROCEDURE — 3074F SYST BP LT 130 MM HG: CPT | Performed by: NURSE PRACTITIONER

## 2023-08-16 PROCEDURE — 3078F DIAST BP <80 MM HG: CPT | Performed by: NURSE PRACTITIONER

## 2023-08-17 ENCOUNTER — PATIENT MESSAGE (OUTPATIENT)
Dept: INTERNAL MEDICINE CLINIC | Facility: CLINIC | Age: 63
End: 2023-08-17

## 2023-08-17 RX ORDER — PHENTERMINE HYDROCHLORIDE 15 MG/1
15 CAPSULE ORAL EVERY MORNING
Qty: 30 CAPSULE | Refills: 1 | Status: SHIPPED | OUTPATIENT
Start: 2023-08-17

## 2023-08-17 NOTE — PATIENT INSTRUCTIONS
Next steps:  1. Fill your prescribed medication and take as discussed and prescribed: phentermine 15mg  Already stopped wegovy   2. Schedule a personal nutrition consultation with one of our registered dieticians     Please try to work on the following dietary changes:  Daily protein recommendation to start:   grams  Daily carbohydrate: <120g  Daily calories: 1,200-1,300  1. Drink water with meals and throughout the day, cut down on soda and/or juice if consumed. Consider flavored water options like Bubbly, Spindrift, Hint and Gemma. 2.  Eat breakfast daily and focus on having protein with each meal, examples include: greek yogurt, cottage cheese, hard boiled egg, whole grain toast with peanut butter. 3.  Reduce refined carbohydrates and sugars which includes items such as sweets, as well as rice, pasta, and bread and make sure to choose whole grain options when having them with just 1 serving per meal about the size of your inner palm. 4.  Consume non starchy veggies daily working towards making them a good 50% of your daily food intake. Add them to lunch and dinner consistently. 5.  Start a daily probiotic: VSL#3 is recommended, (order on line at www.vsl3. com). Take 1 capsule daily with water for 30 days, then reduce to 1 every other day (this will reduce the cost). Capsules can be left out for 2 weeks, but then must be refrigerated. Please download delio My Fitness Lavona Crea! Or Net Diary to monitor daily dietary intake and you will be able to see if you are eating the right amount of calories or too much or too little which would hinder weight loss. Additionally this will help to see your daily carbohydrate and protein intake. When you set the delio up choose 1-2 lbs/week as a goal.  Keeping a paper food journal is an option as well to remain accountable for your choices- this is the start to mindful eating! A low calorie diet has been consistently shown to support weight loss.      Continue or start exercising to help establish a routine. If not already exercising begin with 1 day and progress as able with long-term goal of 30 minutes 5 days a week at a minimum. Meditation daily can help manage and control stress. Chronic stress can make weight loss difficult. Exercising is one way to help with stress, but meditation using the CALM Zelda or another comparable alternative can be done in your home or place of work with little time commitment. This Zelda can also help work on behavior change and improve sleep. Check out the segment under Calm Masterclass and listen to The 4 Pillars of Health. A great way to begin learning about the foundation of lifestyle with practical tips to use in your every day. Check out www.yourweightmatters. org blog for continued daily support and education along this weight loss journey! Patient Resources:     Personal Training/Fitness Classes/Health Coaching     [facility] and [place] @ [email or web address]/ Full fitness center with group fitness and personal training. Discount available as client of [facility]. Health Coaching and Personal Training with Pierre Guthrie at our [facility]- individual weekly coaching with option to add personal training and small group fitness classes targeted at weight loss- [phone] and/or email @ Javier Martinez. [email or web address]  360FIT [place] [email or web address] Group Fitness [phone] and/or email Dana Brock at [email or web address]  [address] [facility] with multiple locations: Aetna (www.Diomics. Surgient), Eat The Frog Fitness (www.Ligand Pharmaceuticals. Surgient), Fit Body Bootcamp (www.DataGravitybodybootcamp.Surgient), Icelandic Glacial Fitness (www.Vakast. Surgient), The Exercise  (www.exercisecoachPictorious)     Online Fitness  Fitness  on [employer] in 10 DVD series- www. ftjdr92WXC. Surgient  Sit and Be Fit - Chair exercise series Www.sitandbefit. org  Hip Hop Fit with Thaddeus Jensen at www.hiphopfit. net     Apps for on the Zendesk 7 Minute Workout (orange box with white 7) - free on the go HIIT training zelda  Peloton Zelda @ wwwIahorro Business Solutions     Nutrition Trackers and Tools  LoseIT! And My Fitness Pal apps and on line for tracking nutrition  NOOM - virtual health coaching  FitFoundation (healthy meals on the go) in [employer] @ www. vurzadkzbbnlk1b. Versa Jeanne PANG @ www.Enable HealthcaretromdCreating Solutions Consulting and Qi Hernández (keto and low carb plans recommended) @ www. [email or web address].WQD, Metabolic Meals @ www. StormWindals. com - individual prepared meals to go  SandLinks, Edhub, International Business Machines, Every Plate, gBox- on line meal delivery programs for preparation at home  [place] in [place] for homemade meals to go @ wwwTamr. Medical Joyworks  Diet Doctor @ www. dietdoctor. com - low carb swaps  Yummly - meal prep and planning zelda (www.yummly. com)     Stress Management/Behavior/Mindful Eating  CALM meditation zelda (www.calm. Medical Joyworks)  Headspace  Am I Hungry? Mindful eating virtual  zelda  Www.yourweightmatters. org - Obesity Action Coalition sponsored Blog posts daily  Motivation zelda (black box with white \")- daily supportive messages sent to your phone     Books/Video Education/Podcasts  Mindless Eating by Tristan Shoulders  Why We Get Sick by Fannie Chaves (a book about insulin resistance)  Atomic Habits by Erika Desouza (a book about taking small steps to promote greater behavior change)   Can't Hurt Me by Ced Palomino (a book exploring the power of discipline in achieving your goals)  The End of Dieting: How to Live for Life by Dr. Jareth Erwin M.D. or listen to The [address] Episode 61: Understanding \"Nutritarian\" Eating w/Dr. Jareth Erwin  Your Body in Balance:  The World [employer] of Food, Hormones, and Health by Dr. Kateryna Yeh  The Menopause Diet Plan by Delvis Sawyer and [facility] AT [facility]  The Complete Guide to fasting by Dr. Jolly Ruby, [address] [facility] by Vera Ibarra Bree Redmond, Ph.D, R.D. Weight Loss Surgery Will Not Treat Food Addiction by Veronika Bagley Ph.D  The [address] [place] [address] on plant based nutrition  Fed Up - documentary about obesity (Free on [place])  The Truth About Sugar - documentary on sugar (Free on Swagbucks, https://youtu. be/3S6xdvzKP8h)  The Dr. Nirmal Strickland by Dr. Nahun Rapp MD  Fitlosophy Fitspiration - journal to better health (found at [employer] in fitness aisle)  What Happened to You?- a look at the impact trauma has on behavior written by Yung Johnston and Dr. Fransico Zhang Again by Elsi Cowan - discovering your true self after trauma  Seda Joel talk on RSP Tooling, The Call to Courage  Podcasts: The Exam Room by the Physician's Committee, Nutrition Facts by Dr. Viki Kemp    We are here to support you with weight loss, but please remember that you still need your primary care provider for your routine health maintenance.

## 2023-10-08 ENCOUNTER — PATIENT MESSAGE (OUTPATIENT)
Dept: INTERNAL MEDICINE CLINIC | Facility: CLINIC | Age: 63
End: 2023-10-08

## 2023-10-25 ENCOUNTER — OFFICE VISIT (OUTPATIENT)
Dept: INTERNAL MEDICINE CLINIC | Facility: CLINIC | Age: 63
End: 2023-10-25

## 2023-10-25 VITALS
DIASTOLIC BLOOD PRESSURE: 78 MMHG | RESPIRATION RATE: 16 BRPM | SYSTOLIC BLOOD PRESSURE: 104 MMHG | WEIGHT: 187 LBS | HEART RATE: 84 BPM | BODY MASS INDEX: 32.72 KG/M2 | HEIGHT: 63.5 IN

## 2023-10-25 DIAGNOSIS — F43.9 STRESS: ICD-10-CM

## 2023-10-25 DIAGNOSIS — Z51.81 THERAPEUTIC DRUG MONITORING: Primary | ICD-10-CM

## 2023-10-25 DIAGNOSIS — E66.9 OBESITY (BMI 30-39.9): ICD-10-CM

## 2023-10-25 DIAGNOSIS — R73.03 PREDIABETES: ICD-10-CM

## 2023-10-25 DIAGNOSIS — I10 HYPERTENSION, UNSPECIFIED TYPE: ICD-10-CM

## 2023-10-25 PROCEDURE — 99213 OFFICE O/P EST LOW 20 MIN: CPT | Performed by: NURSE PRACTITIONER

## 2023-10-25 PROCEDURE — 3008F BODY MASS INDEX DOCD: CPT | Performed by: NURSE PRACTITIONER

## 2023-10-25 PROCEDURE — 3074F SYST BP LT 130 MM HG: CPT | Performed by: NURSE PRACTITIONER

## 2023-10-25 PROCEDURE — 3078F DIAST BP <80 MM HG: CPT | Performed by: NURSE PRACTITIONER

## 2023-10-25 RX ORDER — ONDANSETRON 4 MG/1
4 TABLET, ORALLY DISINTEGRATING ORAL EVERY 6 HOURS PRN
COMMUNITY
Start: 2023-09-07

## 2023-10-25 RX ORDER — PHENTERMINE HYDROCHLORIDE 15 MG/1
15 CAPSULE ORAL
Qty: 30 CAPSULE | Refills: 2 | Status: SHIPPED | OUTPATIENT
Start: 2023-10-25 | End: 2023-10-30

## 2023-10-25 NOTE — PATIENT INSTRUCTIONS
Next steps:  1. Fill your prescribed medication and take as discussed and prescribed: phentermine   2. Schedule a personal nutrition consultation with one of our registered dieticians     Please try to work on the following dietary changes:  Daily protein recommendation to start:  grams  Daily carbohydrate: <100g  Daily calories: 1,200-1,300  1. Drink water with meals and throughout the day, cut down on soda and/or juice if consumed. Consider flavored water options like Bubbly, Spindrift, Hint and Gemma. 2.  Eat breakfast daily and focus on having protein with each meal, examples include: greek yogurt, cottage cheese, hard boiled egg, whole grain toast with peanut butter. 3.  Reduce refined carbohydrates and sugars which includes items such as sweets, as well as rice, pasta, and bread and make sure to choose whole grain options when having them with just 1 serving per meal about the size of your inner palm. 4.  Consume non starchy veggies daily working towards making them a good 50% of your daily food intake. Add them to lunch and dinner consistently. 5.  Start a daily probiotic: VSL#3 is recommended, (order on line at www.vsl3. com). Take 1 capsule daily with water for 30 days, then reduce to 1 every other day (this will reduce the cost). Capsules can be left out for 2 weeks, but then must be refrigerated. Please download delio My Fitness Mercedes Mohan! Or Net Diary to monitor daily dietary intake and you will be able to see if you are eating the right amount of calories or too much or too little which would hinder weight loss. Additionally this will help to see your daily carbohydrate and protein intake. When you set the delio up choose 1-2 lbs/week as a goal.  Keeping a paper food journal is an option as well to remain accountable for your choices- this is the start to mindful eating! A low calorie diet has been consistently shown to support weight loss.      Continue or start exercising to help establish a routine. If not already exercising begin with 1 day and progress as able with long-term goal of 30 minutes 5 days a week at a minimum. Meditation daily can help manage and control stress. Chronic stress can make weight loss difficult. Exercising is one way to help with stress, but meditation using the CALM Zelda or another comparable alternative can be done in your home or place of work with little time commitment. This Zelda can also help work on behavior change and improve sleep. Check out the segment under Calm Masterclass and listen to The 4 Pillars of Health. A great way to begin learning about the foundation of lifestyle with practical tips to use in your every day. Check out www.yourweightmatters. org blog for continued daily support and education along this weight loss journey! Patient Resources:     Personal Training/Fitness Classes/Health Coaching     Vic Owens and [place] @ [email or web address] Full fitness center with group fitness and personal training. Discount available as client of [facility]. Health Coaching and Personal Training with Newton Krishnamurthy at our [facility]- individual weekly coaching with option to add personal training and small group fitness classes targeted at weight loss- [phone] and/or email @ Tacy Rinne. [email or web address]  360FIT [place] [email or web address] Group Fitness [phone] and/or email [place] at [email or web address]  [address] [facility] with multiple locations: Aetna (www.Picomize), Eat The Frog Fitness (www.CITIC Pharmaceutical. MobileHandshake), Fit Body Bootcamp (www.Precise Light Surgicalbodybootcamp.com), HireArt Fitness (www.Ion Torrent. MobileHandshake), The Exercise  (www.exercisecoach.MobileHandshake)     Online Fitness  Fitness  on Whole Foods in 10 DVD series- www. xlvfg65ZFZ. MobileHandshake  Sit and Be Fit - Chair exercise series Www.sitandbefit. org  Hip Hop Fit with Thaddeus Jensen at HyperQuest     Apps for on the [facility] [facility] 7 Minute Workout (orange box with white 7) - free on the go HIIT training zelda  Peloton Zelda @ www. Burt     Nutrition Trackers and Tools  LoseIT! And My Fitness Pal apps and on line for tracking nutrition  NOOM - virtual health coaching  FitFoundation (healthy meals on the go) in [facility] @ www. llmfoznvahtzl9n. Elias Hightower MD @ www.HistoSonicsdStillwater Scientific Instruments and Stanford Heard (keto and low carb plans recommended) @ www. TXNJKY86.MYQ, Metabolic Meals @ www. MyMetabolicMeals. com - individual prepared meals to go  Qwaya, GameAnalytics, International Business Machines, Every Plate, Sponge- on line meal delivery programs for preparation at home  [place] FreeCharge in [place] for homemade meals to go @ wwwDespegar.com  Diet Doctor @ www. dietdoctor. eVariant - low carb swaps  Yummly - meal prep and planning zelda (www.yummly. com)     Stress Management/Behavior/Mindful Eating  CALM meditation zelda (www.Stitch)  Headspace  Am I Hungry? Mindful eating virtual  zelda  Www.yourweightmatters. org - Obesity Action Coalition sponsored Blog posts daily  Motivation zelda (black box with white \")- daily supportive messages sent to your phone     Books/Video Education/Podcasts  Mindless Eating by Guerita Martínez  Why We Get Sick by Judson Castellanos (a book about insulin resistance)  Atomic Habits by Zafar Glez (a book about taking small steps to promote greater behavior change)   Can't Hurt Me by Gunnar Sanders (a book exploring the power of discipline in achieving your goals)  The End of Dieting: How to Live for Life by Dr. Nga Ocasio M.D. or listen to The [address] Episode 61: Understanding \"Nutritarian\" Eating w/Dr. Nga Ocasio  Your Body in Balance: The World [employer] of Food, Hormones, and Health by Dr. Florencia Ziegler  The Menopause Diet Plan by José Luis Maxwell and [facility] AT [facility]  The Complete Guide to fasting by Dr. Sharon Anderson, [address] [facility] by David Herman, Ph.D, R.D.   Weight Loss Surgery Will Not Treat Food Addiction by Bettina Celestin Ph.D  The [address] [place] [address] on plant based nutrition  Fed Up - documentary about obesity (Free on [place])  The Truth About Sugar - documentary on sugar (Free on [place], https://youtu. be/3C3ipwuDF8a)  The Dr. Alena Lyons by Dr. Misti Coyne MD  Fitlosophy Fitspiration - journal to better health (found at [employer] in fitness aisle)  What Happened to You?- a look at the impact trauma has on behavior written by Michaela Silva and Dr. Hallie Nunez Again by Brett Calvert - discovering your true self after trauma  Petra Purdy talk on Alcanzar Solar, The Call to Courage  Podcasts: The Exam Room by the Physician's Committee, Nutrition Facts by Dr. Bobby Baez    We are here to support you with weight loss, but please remember that you still need your primary care provider for your routine health maintenance.

## 2023-10-28 ENCOUNTER — PATIENT MESSAGE (OUTPATIENT)
Dept: INTERNAL MEDICINE CLINIC | Facility: CLINIC | Age: 63
End: 2023-10-28

## 2023-10-28 DIAGNOSIS — E66.9 OBESITY (BMI 30-39.9): ICD-10-CM

## 2023-10-28 DIAGNOSIS — Z51.81 THERAPEUTIC DRUG MONITORING: ICD-10-CM

## 2023-10-30 RX ORDER — PHENTERMINE HYDROCHLORIDE 15 MG/1
15 CAPSULE ORAL
Qty: 180 CAPSULE | Refills: 0 | Status: SHIPPED | OUTPATIENT
Start: 2023-10-30

## 2023-10-30 NOTE — TELEPHONE ENCOUNTER
Rx was sent for 15 days and 2 refills. Pt is requesting 90 day Rx. Please send new Rx if agreeable. Pended.    (Pt Rx is for 15mg BID)

## 2023-10-30 NOTE — TELEPHONE ENCOUNTER
From: Edison Vann  To: Jessica Duffy  Sent: 10/28/2023 12:08 PM CDT  Subject: Rx    I picked up my Rx for Phentermine and it was for 2 weeks with 2 refills. Maybe I misunderstood but I thought you were ordering 3 months? Or should I just message you when I fill the 2nd refill for a new Rx?

## 2024-04-22 DIAGNOSIS — Z12.31 ENCOUNTER FOR SCREENING MAMMOGRAM FOR BREAST CANCER: Primary | ICD-10-CM

## 2024-05-07 ENCOUNTER — APPOINTMENT (OUTPATIENT)
Dept: MAMMOGRAPHY | Age: 64
End: 2024-05-07
Attending: FAMILY MEDICINE

## 2024-05-07 DIAGNOSIS — Z12.31 ENCOUNTER FOR SCREENING MAMMOGRAM FOR BREAST CANCER: ICD-10-CM

## 2024-05-07 PROCEDURE — 77063 BREAST TOMOSYNTHESIS BI: CPT | Performed by: RADIOLOGY

## 2024-05-07 PROCEDURE — 77067 SCR MAMMO BI INCL CAD: CPT | Performed by: RADIOLOGY

## 2025-08-27 ENCOUNTER — TELEPHONE (OUTPATIENT)
Dept: BARIATRICS/WEIGHT MGMT | Age: 65
End: 2025-08-27

## 2025-09-02 ENCOUNTER — TELEPHONE (OUTPATIENT)
Dept: BARIATRICS/WEIGHT MGMT | Age: 65
End: 2025-09-02

## 2025-09-16 ENCOUNTER — APPOINTMENT (OUTPATIENT)
Dept: BARIATRICS/WEIGHT MGMT | Age: 65
End: 2025-09-16

## (undated) DEVICE — Device

## (undated) DEVICE — LAWSON - GOWN SURG STD XL STL DISP